# Patient Record
Sex: MALE | Race: WHITE | NOT HISPANIC OR LATINO
[De-identification: names, ages, dates, MRNs, and addresses within clinical notes are randomized per-mention and may not be internally consistent; named-entity substitution may affect disease eponyms.]

---

## 2020-06-03 ENCOUNTER — ERX REFILL RESPONSE (OUTPATIENT)
Age: 45
End: 2020-06-03

## 2020-06-03 RX ORDER — SPIRONOLACTONE 25 MG/1
TAKE 1 TABLET BY MOUTH TWICE A DAY TABLET ORAL
Qty: 60 | Refills: 1

## 2020-06-03 RX ORDER — FUROSEMIDE 40 MG/1
TAKE 1 TABLET BY MOUTH EVERY DAY TABLET ORAL
Qty: 30 | Refills: 0

## 2020-07-02 ENCOUNTER — TELEPHONE ENCOUNTER (OUTPATIENT)
Dept: URBAN - METROPOLITAN AREA CLINIC 92 | Facility: CLINIC | Age: 45
End: 2020-07-02

## 2020-07-02 ENCOUNTER — OFFICE VISIT (OUTPATIENT)
Dept: URBAN - METROPOLITAN AREA MEDICAL CENTER 42 | Facility: MEDICAL CENTER | Age: 45
End: 2020-07-02
Payer: COMMERCIAL

## 2020-07-02 DIAGNOSIS — K74.69 CIRRHOSIS, CRYPTOGENIC: ICD-10-CM

## 2020-07-02 DIAGNOSIS — K31.89 ACQUIRED DEFORMITY OF PYLORUS: ICD-10-CM

## 2020-07-02 PROCEDURE — 43235 EGD DIAGNOSTIC BRUSH WASH: CPT | Performed by: INTERNAL MEDICINE

## 2020-07-02 RX ORDER — OXYCODONE HYDROCHLORIDE 5 MG/1
TABLET ORAL
Qty: 0 | Refills: 0 | Status: ACTIVE | COMMUNITY
Start: 1900-01-01 | End: 1900-01-01

## 2020-07-02 RX ORDER — FUROSEMIDE 40 MG/1
TAKE 1 TABLET BY MOUTH EVERY DAY TABLET ORAL
Qty: 30 | Refills: 0 | Status: ACTIVE | COMMUNITY

## 2020-07-02 RX ORDER — LACTULOSE 10 G/15ML
TAKE 30 MILLILITERS (20 GRAM) BY ORAL ROUTE 3 TIMES PER DAY FOR 30 DAYS SOLUTION ORAL
Qty: 2700 | Refills: 3 | Status: ACTIVE | COMMUNITY
Start: 2020-05-01 | End: 2020-08-29

## 2020-07-02 RX ORDER — RIFAXIMIN 550 MG/1
TAKE 1 TABLET (550 MG) BY ORAL ROUTE 2 TIMES PER DAY FOR 30 DAYS TABLET ORAL 2
Qty: 60 | Refills: 11 | Status: ACTIVE | COMMUNITY
Start: 2020-05-15 | End: 2021-05-10

## 2020-07-02 RX ORDER — SPIRONOLACTONE 25 MG/1
TAKE 1 TABLET BY MOUTH TWICE A DAY TABLET ORAL
Qty: 60 | Refills: 1 | Status: ACTIVE | COMMUNITY

## 2020-07-07 ENCOUNTER — TELEPHONE ENCOUNTER (OUTPATIENT)
Dept: URBAN - METROPOLITAN AREA CLINIC 70 | Facility: CLINIC | Age: 45
End: 2020-07-07

## 2020-07-07 RX ORDER — DICYCLOMINE HYDROCHLORIDE 10 MG/1
1 TABLET CAPSULE ORAL
Qty: 90 | Refills: 1 | OUTPATIENT

## 2020-07-07 RX ORDER — OXYCODONE HYDROCHLORIDE 5 MG/1
TABLET ORAL
Qty: 0 | Refills: 0 | Status: ACTIVE | COMMUNITY
Start: 1900-01-01 | End: 1900-01-01

## 2020-07-07 RX ORDER — FUROSEMIDE 40 MG/1
TAKE 1 TABLET BY MOUTH EVERY DAY TABLET ORAL
Qty: 30 | Refills: 0 | Status: ACTIVE | COMMUNITY

## 2020-07-07 RX ORDER — SPIRONOLACTONE 25 MG/1
TAKE 1 TABLET BY MOUTH TWICE A DAY TABLET ORAL
Qty: 60 | Refills: 1 | Status: ACTIVE | COMMUNITY

## 2020-07-07 RX ORDER — RIFAXIMIN 550 MG/1
TAKE 1 TABLET (550 MG) BY ORAL ROUTE 2 TIMES PER DAY FOR 30 DAYS TABLET ORAL 2
Qty: 60 | Refills: 11 | Status: ACTIVE | COMMUNITY
Start: 2020-05-15 | End: 2021-05-10

## 2020-07-07 RX ORDER — LACTULOSE 10 G/15ML
TAKE 30 MILLILITERS (20 GRAM) BY ORAL ROUTE 3 TIMES PER DAY FOR 30 DAYS SOLUTION ORAL
Qty: 2700 | Refills: 3 | Status: ACTIVE | COMMUNITY
Start: 2020-05-01 | End: 2020-08-29

## 2020-07-07 RX ORDER — PANTOPRAZOLE SODIUM 40 MG/1
1 TABLET TABLET, DELAYED RELEASE ORAL TWICE A DAY
Qty: 60 | Refills: 5 | OUTPATIENT
Start: 2020-07-07

## 2020-07-15 ENCOUNTER — OFFICE VISIT (OUTPATIENT)
Dept: URBAN - METROPOLITAN AREA CLINIC 70 | Facility: CLINIC | Age: 45
End: 2020-07-15
Payer: COMMERCIAL

## 2020-07-15 ENCOUNTER — WEB ENCOUNTER (OUTPATIENT)
Dept: URBAN - METROPOLITAN AREA CLINIC 70 | Facility: CLINIC | Age: 45
End: 2020-07-15

## 2020-07-15 ENCOUNTER — OFFICE VISIT (OUTPATIENT)
Dept: URBAN - METROPOLITAN AREA CLINIC 70 | Facility: CLINIC | Age: 45
End: 2020-07-15

## 2020-07-15 DIAGNOSIS — R10.84 GENERALIZED ABDOMINAL PAIN: ICD-10-CM

## 2020-07-15 DIAGNOSIS — K72.90 HEPATIC ENCEPHALOPATHY: ICD-10-CM

## 2020-07-15 DIAGNOSIS — R19.7 DIARRHEA, UNSPECIFIED TYPE: ICD-10-CM

## 2020-07-15 DIAGNOSIS — K21.9 GASTROESOPHAGEAL REFLUX DISEASE WITHOUT ESOPHAGITIS: ICD-10-CM

## 2020-07-15 DIAGNOSIS — K70.31 ALCOHOLIC CIRRHOSIS OF LIVER WITH ASCITES: ICD-10-CM

## 2020-07-15 PROCEDURE — G8420 CALC BMI NORM PARAMETERS: HCPCS | Performed by: INTERNAL MEDICINE

## 2020-07-15 PROCEDURE — G8427 DOCREV CUR MEDS BY ELIG CLIN: HCPCS | Performed by: INTERNAL MEDICINE

## 2020-07-15 PROCEDURE — 1036F TOBACCO NON-USER: CPT | Performed by: INTERNAL MEDICINE

## 2020-07-15 PROCEDURE — 99214 OFFICE O/P EST MOD 30 MIN: CPT | Performed by: INTERNAL MEDICINE

## 2020-07-15 RX ORDER — RIFAXIMIN 550 MG/1
TAKE 1 TABLET (550 MG) BY ORAL ROUTE 2 TIMES PER DAY FOR 30 DAYS TABLET ORAL 2
Qty: 60 | Refills: 11 | Status: DISCONTINUED | COMMUNITY
Start: 2020-05-15 | End: 2021-05-10

## 2020-07-15 RX ORDER — LACTULOSE 10 G/15ML
TAKE 30 MILLILITERS (20 GRAM) BY ORAL ROUTE 3 TIMES PER DAY FOR 30 DAYS SOLUTION ORAL
Qty: 2700 | Refills: 3 | Status: DISCONTINUED | COMMUNITY
Start: 2020-05-01 | End: 2020-08-29

## 2020-07-15 RX ORDER — DICYCLOMINE HYDROCHLORIDE 10 MG/1
1 TABLET CAPSULE ORAL
Qty: 90 | Refills: 1 | Status: DISCONTINUED | COMMUNITY

## 2020-07-15 RX ORDER — OXYCODONE HYDROCHLORIDE 5 MG/1
TABLET ORAL
Qty: 0 | Refills: 0 | Status: DISCONTINUED | COMMUNITY
Start: 1900-01-01

## 2020-07-15 RX ORDER — PANTOPRAZOLE SODIUM 40 MG/1
1 TABLET TABLET, DELAYED RELEASE ORAL TWICE A DAY
Qty: 60 | Refills: 5 | Status: ACTIVE | COMMUNITY
Start: 2020-07-07

## 2020-07-15 RX ORDER — RIFAXIMIN 550 MG/1
TAKE 1 TABLET (550 MG) BY ORAL ROUTE 2 TIMES PER DAY FOR 30 DAYS TABLET ORAL 2
Qty: 60 | Refills: 11 | Status: ACTIVE | COMMUNITY
Start: 2020-05-15 | End: 2021-05-10

## 2020-07-15 RX ORDER — DICYCLOMINE HYDROCHLORIDE 10 MG/1
1 TABLET CAPSULE ORAL
Qty: 90 | Refills: 1 | Status: ACTIVE | COMMUNITY

## 2020-07-15 RX ORDER — SPIRONOLACTONE 25 MG/1
TAKE 1 TABLET BY MOUTH TWICE A DAY TABLET ORAL
Qty: 60 | Refills: 1 | Status: ACTIVE | COMMUNITY

## 2020-07-15 RX ORDER — PANTOPRAZOLE SODIUM 40 MG/1
1 TABLET TABLET, DELAYED RELEASE ORAL TWICE A DAY
Qty: 60 | Refills: 5 | Status: DISCONTINUED | COMMUNITY
Start: 2020-07-07

## 2020-07-15 RX ORDER — LACTULOSE 10 G/15ML
TAKE 30 MILLILITERS (20 GRAM) BY ORAL ROUTE 3 TIMES PER DAY FOR 30 DAYS SOLUTION ORAL
Qty: 2700 | Refills: 3 | Status: ACTIVE | COMMUNITY
Start: 2020-05-01 | End: 2020-08-29

## 2020-07-15 RX ORDER — OXYCODONE HYDROCHLORIDE 5 MG/1
TABLET ORAL
Qty: 0 | Refills: 0 | Status: ACTIVE | COMMUNITY
Start: 1900-01-01 | End: 1900-01-01

## 2020-07-15 RX ORDER — SPIRONOLACTONE 25 MG/1
TAKE 1 TABLET BY MOUTH TWICE A DAY TABLET ORAL
Qty: 60 | Refills: 1 | Status: DISCONTINUED | COMMUNITY

## 2020-07-15 RX ORDER — FUROSEMIDE 40 MG/1
TAKE 1 TABLET BY MOUTH EVERY DAY TABLET ORAL
Qty: 30 | Refills: 0 | Status: ACTIVE | COMMUNITY

## 2020-07-15 RX ORDER — FUROSEMIDE 40 MG/1
TAKE 1 TABLET BY MOUTH EVERY DAY TABLET ORAL
Qty: 30 | Refills: 0 | Status: DISCONTINUED | COMMUNITY

## 2020-07-15 NOTE — HPI-OTHER HISTORIES
44 year old male presents for Telehealth followup of cirrhosis. His history dates back to February 2020 where he noted progressively worsening abdominal distention and bloating He reports a very long history of alcohol use since adolescence. Viral hepatitis panel was negative. He was seen in the ED 4/15/20 in the setting of abdominal distention and bloating. He subsequently underwent labwork which revealed AST 70 ALT 54 alk phos 253 t-bili 9.8 (from 17.5), AFP 4.2 US paracentesis yielded 3.3L, cytology -ve. CT A/P 4/15/20 revealed moderate ascites, cirrhotic liver, and two areas of hyperenhancement on R liver lobe, rule out neoplasia. On 4/28 F/U he reports he has remained abstienent from alcohol since March 2020. He reports increasing abdominal girth as well as forgetfulness and "foggy memory." Denies melena or hematochezia Is maintained on Lasix 40mg qDay and Aldactone 25mg bid, is was  fully compliant with 2g Na diet- does report some LE edema. Since LOV he obtained CT liver 3-phase which revealed 5.3cm R liver lobe ill defined area, additionally another ill-defined area was noted, hyperenhancing in the R lobe, possible regenerative nodule. Lab data revealed the following: AST 30 ALT 31  T-bili 3.8 (down from 17), Na 135, Cr 0.71, INR 1.09, AFP 4.2, MELD-Na=14. Viral hepatitis panel and extended liver-2 panel negative. Paracentesis yielded 5500cc fluid, cytology negative for malignant cells.  He reports he was unable to tolerate Aldactone or Lactulose (due to diarrhea), now on Lasix alone. He has been compliant with 2g low sodium diet and reports less edema. Underwent EGD 7/2/20 revealing normal esophagus with no varices, mild PHG, significant retained food in stomach, suboptimal retroflexed view due to food in stomach, normal duodenum. He reported significant abdominal cramping/pain and diarrhea as well. States symptoms are quite debilitating.

## 2020-07-15 NOTE — HPI-TODAY'S VISIT:
44 year old male presents today in office for F/U of cirrhosis secondary to prior alcohol use . His history dates back to February 2020 where he noted progressively worsening abdominal distention and bloating He reports a very long history of alcohol use since adolescence. Viral hepatitis panel was negative. He was seen in the ED 4/15/20 in the setting of abdominal distention and bloating. He subsequently underwent labwork which revealed AST 70 ALT 54 alk phos 253 t-bili 9.8 (from 17.5), AFP 4.2 US paracentesis yielded 3.3L, cytology -ve. CT A/P 4/15/20 revealed moderate ascites, cirrhotic liver, and two areas of hyperenhancement on R liver lobe, rule out neoplasia. On 4/28 F/U he reports he has remained abstienent from alcohol since March 2020. He reports increasing abdominal girth as well as forgetfulness and "foggy memory." Denies melena or hematochezia Is maintained on Lasix 40mg qDay and Aldactone 25mg bid, is was  fully compliant with 2g Na diet- does report some LE edema. Since LOV he obtained CT liver 3-phase which revealed 5.3cm R liver lobe ill defined area, additionally another ill-defined area was noted, hyperenhancing in the R lobe, possible regenerative nodule. Lab data revealed the following: AST 30 ALT 31  T-bili 3.8 (down from 17), Na 135, Cr 0.71, INR 1.09, AFP 4.2, MELD-Na=14. Viral hepatitis panel and extended liver-2 panel negative. Paracentesis yielded 5500cc fluid, cytology negative for malignant cells.  He reports he was unable to tolerate Aldactone or Lactulose (due to diarrhea), now on Lasix alone. He has been compliant with 2g low sodium diet and reports less edema. EGD 7/2/20- normal esophagus no EVs, mild PHG, moderate retained food (biopsies not taken due to this), impaired retroflexed views, normal duodenum. Reported abd pain, cramping and diarrhea at day of endoscopy appt as well as nausea. He states that this has all resolved. Reports feeling much better since the onset of this.

## 2020-07-21 ENCOUNTER — ERX REFILL RESPONSE (OUTPATIENT)
Dept: URBAN - METROPOLITAN AREA CLINIC 70 | Facility: CLINIC | Age: 45
End: 2020-07-21

## 2020-07-21 RX ORDER — PANTOPRAZOLE SODIUM 40 MG/1
TAKE 1 TABLET BY MOUTH TWICE A DAY TABLET, DELAYED RELEASE ORAL
Qty: 180 TABLET | Refills: 2 | OUTPATIENT

## 2020-07-21 RX ORDER — PANTOPRAZOLE SODIUM 40 MG/1
1 TABLET TABLET, DELAYED RELEASE ORAL TWICE A DAY
Qty: 60 | Refills: 5 | OUTPATIENT

## 2020-07-31 ENCOUNTER — ERX REFILL RESPONSE (OUTPATIENT)
Dept: URBAN - METROPOLITAN AREA CLINIC 70 | Facility: CLINIC | Age: 45
End: 2020-07-31

## 2020-07-31 RX ORDER — DICYCLOMINE HYDROCHLORIDE 10 MG/1
TAKE 1 CAPSULE BY MOUTH 3 TIMES A DAY AS NEEDED FOR CRAMPS CAPSULE ORAL
Qty: 270 CAPSULE | Refills: 1 | OUTPATIENT

## 2020-07-31 RX ORDER — DICYCLOMINE HYDROCHLORIDE 10 MG/1
1 TABLET CAPSULE ORAL
Qty: 90 | Refills: 1 | OUTPATIENT

## 2020-08-26 ENCOUNTER — OFFICE VISIT (OUTPATIENT)
Dept: URBAN - METROPOLITAN AREA CLINIC 70 | Facility: CLINIC | Age: 45
End: 2020-08-26
Payer: COMMERCIAL

## 2020-08-26 DIAGNOSIS — R10.84 GENERALIZED ABDOMINAL PAIN: ICD-10-CM

## 2020-08-26 DIAGNOSIS — K72.90 HEPATIC ENCEPHALOPATHY: ICD-10-CM

## 2020-08-26 DIAGNOSIS — R19.7 DIARRHEA, UNSPECIFIED TYPE: ICD-10-CM

## 2020-08-26 DIAGNOSIS — K70.31 ALCOHOLIC CIRRHOSIS OF LIVER WITH ASCITES: ICD-10-CM

## 2020-08-26 DIAGNOSIS — K21.9 GASTROESOPHAGEAL REFLUX DISEASE WITHOUT ESOPHAGITIS: ICD-10-CM

## 2020-08-26 PROCEDURE — 992 APS NON BILLABLE: Performed by: INTERNAL MEDICINE

## 2020-08-26 RX ORDER — PANTOPRAZOLE SODIUM 40 MG/1
TAKE 1 TABLET BY MOUTH TWICE A DAY TABLET, DELAYED RELEASE ORAL
Qty: 180 TABLET | Refills: 2 | Status: DISCONTINUED | COMMUNITY

## 2020-08-26 RX ORDER — DICYCLOMINE HYDROCHLORIDE 10 MG/1
TAKE 1 CAPSULE BY MOUTH 3 TIMES A DAY AS NEEDED FOR CRAMPS CAPSULE ORAL
Qty: 270 CAPSULE | Refills: 1 | Status: DISCONTINUED | COMMUNITY

## 2020-08-26 NOTE — HPI-TODAY'S VISIT:
44 year old male presents today in office for F/U of cirrhosis secondary to prior alcohol use . His history dates back to February 2020 where he noted progressively worsening abdominal distention and bloating He reports a very long history of alcohol use since adolescence. Viral hepatitis panel was negative. He was seen in the ED 4/15/20 in the setting of abdominal distention and bloating. He subsequently underwent labwork which revealed AST 70 ALT 54 alk phos 253 t-bili 9.8 (from 17.5), AFP 4.2 US paracentesis yielded 3.3L, cytology -ve. CT A/P 4/15/20 revealed moderate ascites, cirrhotic liver, and two areas of hyperenhancement on R liver lobe, rule out neoplasia. On 4/28 F/U he reports he has remained abstienent from alcohol since March 2020. He reports increasing abdominal girth as well as forgetfulness and "foggy memory." Denies melena or hematochezia Is maintained on Lasix 40mg qDay and Aldactone 25mg bid, is was  fully compliant with 2g Na diet- does report some LE edema. Since LOV he obtained CT liver 3-phase which revealed 5.3cm R liver lobe ill defined area, additionally another ill-defined area was noted, hyperenhancing in the R lobe, possible regenerative nodule. Lab data revealed the following: AST 30 ALT 31  T-bili 3.8 (down from 17), Na 135, Cr 0.71, INR 1.09, AFP 4.2, MELD-Na=14. Viral hepatitis panel and extended liver-2 panel negative. Paracentesis yielded 5500cc fluid, cytology negative for malignant cells.  He reports he was unable to tolerate Aldactone or Lactulose (due to diarrhea), now on Lasix alone. He has been compliant with 2g low sodium diet and reports less edema. EGD 7/2/20- normal esophagus no EVs, mild PHG, moderate retained food (biopsies not taken due to this), impaired retroflexed views, normal duodenum. Reported abd pain, cramping and diarrhea at day of endoscopy appt as well as nausea. He states that this has all significantly improved, reports some alternating diarrhea/constipation but he states it is significantly improved. Some early satiety, reports he is not taking Protonix as he can't afford it even with the [employer] coupon. Repeat Labs 7/15/20 revealed AST 19 ALT 29 tbili 1.2, alk phos 61, INR 1.08. No encephalopaty, no LE edema.  Reports feeling much better since the onset of this.

## 2020-10-28 ENCOUNTER — LAB OUTSIDE AN ENCOUNTER (OUTPATIENT)
Dept: URBAN - METROPOLITAN AREA CLINIC 70 | Facility: CLINIC | Age: 45
End: 2020-10-28

## 2020-10-28 ENCOUNTER — OFFICE VISIT (OUTPATIENT)
Dept: URBAN - METROPOLITAN AREA CLINIC 70 | Facility: CLINIC | Age: 45
End: 2020-10-28
Payer: COMMERCIAL

## 2020-10-28 DIAGNOSIS — R19.7 DIARRHEA, UNSPECIFIED TYPE: ICD-10-CM

## 2020-10-28 DIAGNOSIS — K56.609 SMALL BOWEL OBSTRUCTION: ICD-10-CM

## 2020-10-28 DIAGNOSIS — K72.90 HEPATIC ENCEPHALOPATHY: ICD-10-CM

## 2020-10-28 DIAGNOSIS — R10.84 GENERALIZED ABDOMINAL PAIN: ICD-10-CM

## 2020-10-28 DIAGNOSIS — K70.31 ALCOHOLIC CIRRHOSIS OF LIVER WITH ASCITES: ICD-10-CM

## 2020-10-28 DIAGNOSIS — K21.9 GASTROESOPHAGEAL REFLUX DISEASE WITHOUT ESOPHAGITIS: ICD-10-CM

## 2020-10-28 PROCEDURE — G8482 FLU IMMUNIZE ORDER/ADMIN: HCPCS | Performed by: INTERNAL MEDICINE

## 2020-10-28 PROCEDURE — 99213 OFFICE O/P EST LOW 20 MIN: CPT | Performed by: INTERNAL MEDICINE

## 2020-10-28 PROCEDURE — G8420 CALC BMI NORM PARAMETERS: HCPCS | Performed by: INTERNAL MEDICINE

## 2020-10-28 PROCEDURE — G9903 PT SCRN TBCO ID AS NON USER: HCPCS | Performed by: INTERNAL MEDICINE

## 2020-10-28 PROCEDURE — G8427 DOCREV CUR MEDS BY ELIG CLIN: HCPCS | Performed by: INTERNAL MEDICINE

## 2020-10-28 RX ORDER — LACTULOSE 10 G/15ML
15 ML SOLUTION ORAL THREE TIMES A DAY
Qty: 1350 ML | Refills: 6 | OUTPATIENT
Start: 2020-10-28 | End: 2021-05-26

## 2020-10-28 NOTE — HPI-TODAY'S VISIT:
44 year old male presents today in office for F/U of cirrhosis secondary to prior alcohol use . His history dates back to February 2020 where he noted progressively worsening abdominal distention and bloating He reports a very long history of alcohol use since adolescence. Viral hepatitis panel was negative. He was seen in the ED 4/15/20 in the setting of abdominal distention and bloating. He subsequently underwent labwork which revealed AST 70 ALT 54 alk phos 253 t-bili 9.8 (from 17.5), AFP 4.2 US paracentesis yielded 3.3L, cytology -ve. CT A/P 4/15/20 revealed moderate ascites, cirrhotic liver, and two areas of hyperenhancement on R liver lobe, rule out neoplasia. On 4/28 F/U he reports he has remained abstienent from alcohol since March 2020. He reports increasing abdominal girth as well as forgetfulness and "foggy memory." Denies melena or hematochezia Is maintained on Lasix 40mg qDay and Aldactone 25mg bid, is was  fully compliant with 2g Na diet- does report some LE edema. Since LOV he obtained CT liver 3-phase which revealed 5.3cm R liver lobe ill defined area, additionally another ill-defined area was noted, hyperenhancing in the R lobe, possible regenerative nodule. Lab data revealed the following: AST 30 ALT 31  T-bili 3.8 (down from 17), Na 135, Cr 0.71, INR 1.09, AFP 4.2, MELD-Na=14. Viral hepatitis panel and extended liver-2 panel negative. Paracentesis yielded 5500cc fluid, cytology negative for malignant cells.  He reports he was unable to tolerate Aldactone or Lactulose (due to diarrhea), now on Lasix alone. He has been compliant with 2g low sodium diet and reports less edema. EGD 7/2/20- normal esophagus no EVs, mild PHG, moderate retained food (biopsies not taken due to this), impaired retroflexed views, normal duodenum. Reported abd pain, cramping and diarrhea at day of endoscopy appt as well as nausea. He states that this has all significantly improved, reports some alternating diarrhea/constipation but he states it is significantly improved. Some early satiety, reports he is not taking Protonix as he can't afford it even with the [employer] coupon. Repeat Labs 7/15/20 revealed AST 19 ALT 29 tbili 1.2, alk phos 61, INR 1.08. No encephalopaty, no LE edema.  On today's OV was seen 9/28 in the ED due to abd pain, emesis x 1 episode CT A/P with contrast revealed cirrhotic liver without mass lesions, but possible evolving SBO. He left AMA Today on F/U he states he is taking no medications. He has not taken lactulose. He reports reversal in sleep/wake cycle, difficulty sleeping, anxiety and depression, as well as generalized weakness. Reports intermittent abdominal cramping.

## 2020-10-30 LAB
A/G RATIO: 2.2
ALBUMIN: 4.3
ALKALINE PHOSPHATASE: 57
ALT (SGPT): 21
AST (SGOT): 15
BASO (ABSOLUTE): 0.1
BASOS: 1
BILIRUBIN, TOTAL: 0.3
BUN/CREATININE RATIO: 19
BUN: 17
CALCIUM: 9.4
CARBON DIOXIDE, TOTAL: 26
CHLORIDE: 99
CREATININE: 0.88
EGFR IF AFRICN AM: 120
EGFR IF NONAFRICN AM: 104
EOS (ABSOLUTE): 0.3
EOS: 3
GLOBULIN, TOTAL: 2
GLUCOSE: 135
HEMATOCRIT: 42.1
HEMATOLOGY COMMENTS:: (no result)
HEMOGLOBIN: 13.9
IMMATURE CELLS: (no result)
IMMATURE GRANS (ABS): 0
IMMATURE GRANULOCYTES: 0
INR: 1
LYMPHS (ABSOLUTE): 3
LYMPHS: 36
MCH: 30.5
MCHC: 33
MCV: 92
MONOCYTES(ABSOLUTE): 0.5
MONOCYTES: 6
NEUTROPHILS (ABSOLUTE): 4.7
NEUTROPHILS: 54
NRBC: (no result)
PLATELETS: 213
POTASSIUM: 4.6
PROTEIN, TOTAL: 6.3
PROTHROMBIN TIME: 10.9
RBC: 4.56
RDW: 12.8
SODIUM: 138
TSH: 2.04
WBC: 8.5

## 2020-12-01 ENCOUNTER — OFFICE VISIT (OUTPATIENT)
Dept: URBAN - METROPOLITAN AREA CLINIC 70 | Facility: CLINIC | Age: 45
End: 2020-12-01
Payer: COMMERCIAL

## 2020-12-01 DIAGNOSIS — K21.9 GASTROESOPHAGEAL REFLUX DISEASE WITHOUT ESOPHAGITIS: ICD-10-CM

## 2020-12-01 DIAGNOSIS — K56.609 SMALL BOWEL OBSTRUCTION: ICD-10-CM

## 2020-12-01 DIAGNOSIS — K70.31 ALCOHOLIC CIRRHOSIS OF LIVER WITH ASCITES: ICD-10-CM

## 2020-12-01 DIAGNOSIS — R10.84 GENERALIZED ABDOMINAL PAIN: ICD-10-CM

## 2020-12-01 DIAGNOSIS — K72.90 HEPATIC ENCEPHALOPATHY: ICD-10-CM

## 2020-12-01 DIAGNOSIS — R19.7 DIARRHEA, UNSPECIFIED TYPE: ICD-10-CM

## 2020-12-01 PROCEDURE — G8420 CALC BMI NORM PARAMETERS: HCPCS | Performed by: INTERNAL MEDICINE

## 2020-12-01 PROCEDURE — 99213 OFFICE O/P EST LOW 20 MIN: CPT | Performed by: INTERNAL MEDICINE

## 2020-12-01 PROCEDURE — G9903 PT SCRN TBCO ID AS NON USER: HCPCS | Performed by: INTERNAL MEDICINE

## 2020-12-01 PROCEDURE — G8427 DOCREV CUR MEDS BY ELIG CLIN: HCPCS | Performed by: INTERNAL MEDICINE

## 2020-12-01 PROCEDURE — G8483 FLU IMM NO ADMIN DOC REA: HCPCS | Performed by: INTERNAL MEDICINE

## 2020-12-01 RX ORDER — DICYCLOMINE HYDROCHLORIDE 10 MG/1
1 TABLET CAPSULE ORAL THREE TIMES A DAY
Qty: 90 TABLET | Refills: 3 | OUTPATIENT
Start: 2020-12-01 | End: 2021-03-31

## 2020-12-01 RX ORDER — PANTOPRAZOLE SODIUM 40 MG/1
1 TABLET TABLET, DELAYED RELEASE ORAL ONCE A DAY
Qty: 30 TABLET | Refills: 6 | OUTPATIENT
Start: 2020-12-01

## 2020-12-01 RX ORDER — LACTULOSE 10 G/15ML
15 ML SOLUTION ORAL THREE TIMES A DAY
Qty: 1350 ML | Refills: 6 | Status: ACTIVE | COMMUNITY
Start: 2020-10-28 | End: 2021-05-26

## 2020-12-01 NOTE — HPI-TODAY'S VISIT:
44 year old male presents today in office for F/U of cirrhosis secondary to prior alcohol use . His history dates back to February 2020 where he noted progressively worsening abdominal distention and bloating He reports a very long history of alcohol use since adolescence. Viral hepatitis panel was negative. He was seen in the ED 4/15/20 in the setting of abdominal distention and bloating. He subsequently underwent labwork which revealed AST 70 ALT 54 alk phos 253 t-bili 9.8 (from 17.5), AFP 4.2 US paracentesis yielded 3.3L, cytology -ve. CT A/P 4/15/20 revealed moderate ascites, cirrhotic liver, and two areas of hyperenhancement on R liver lobe, rule out neoplasia. On 4/28 F/U he reports he has remained abstienent from alcohol since March 2020. He reports increasing abdominal girth as well as forgetfulness and "foggy memory." Denies melena or hematochezia Is maintained on Lasix 40mg qDay and Aldactone 25mg bid, is was  fully compliant with 2g Na diet- does report some LE edema. Since LOV he obtained CT liver 3-phase which revealed 5.3cm R liver lobe ill defined area, additionally another ill-defined area was noted, hyperenhancing in the R lobe, possible regenerative nodule. Lab data revealed the following: AST 30 ALT 31  T-bili 3.8 (down from 17), Na 135, Cr 0.71, INR 1.09, AFP 4.2, MELD-Na=14. Viral hepatitis panel and extended liver-2 panel negative. Paracentesis yielded 5500cc fluid, cytology negative for malignant cells.  He reports he was unable to tolerate Aldactone or Lactulose (due to diarrhea), now on Lasix alone. He has been compliant with 2g low sodium diet and reports less edema. EGD 7/2/20- normal esophagus no EVs, mild PHG, moderate retained food (biopsies not taken due to this), impaired retroflexed views, normal duodenum. Reported abd pain, cramping and diarrhea at day of endoscopy appt as well as nausea. He states that this has all significantly improved, reports some alternating diarrhea/constipation but he states it is significantly improved. Some early satiety, reports he is not taking Protonix as he can't afford it even with the [employer] coupon. Repeat Labs 7/15/20 revealed AST 19 ALT 29 tbili 1.2, alk phos 61, INR 1.08. No encephalopaty, no LE edema.  On today's OV was seen 9/28 in the ED due to abd pain, emesis x 1 episode CT A/P with contrast revealed cirrhotic liver without mass lesions, but possible evolving SBO. He left AMA Today on F/U he states he is taking no medications. He has not taken lactulose. He reports reversal in sleep/wake cycle, difficulty sleeping, anxiety and depression, as well as generalized weakness. Reports intermittent abdominal cramping and today on F/U 12/1/20 he reports return of dyspepsia, as well as 1 episode of emesis over thanksgiving, he reports intermittent diarrhea as well, with alternating normal stool, as well as abdominal cramping. As stated above, he reports he is not on any medication at present. Lab data was obtained 9/29/20 revealing normal Hb, normal WBC and normal LFTs: AST 15 ALT 21 alk phos 57 t bili 0.3. He has not yet established care with a PCP and has reported less optimal control of blood glucose, has reported intermittent dizziness as did report dietary indiscretion during thanksgiving holiday.

## 2021-01-13 ENCOUNTER — OFFICE VISIT (OUTPATIENT)
Dept: URBAN - METROPOLITAN AREA CLINIC 70 | Facility: CLINIC | Age: 46
End: 2021-01-13
Payer: COMMERCIAL

## 2021-01-13 VITALS
WEIGHT: 138.6 LBS | TEMPERATURE: 97.1 F | DIASTOLIC BLOOD PRESSURE: 81 MMHG | HEART RATE: 90 BPM | HEIGHT: 68 IN | BODY MASS INDEX: 21.01 KG/M2 | SYSTOLIC BLOOD PRESSURE: 152 MMHG

## 2021-01-13 DIAGNOSIS — R11.2 NON-INTRACTABLE VOMITING WITH NAUSEA, UNSPECIFIED VOMITING TYPE: ICD-10-CM

## 2021-01-13 DIAGNOSIS — R10.84 GENERALIZED ABDOMINAL PAIN: ICD-10-CM

## 2021-01-13 DIAGNOSIS — K70.30 ALCOHOLIC CIRRHOSIS OF LIVER WITHOUT ASCITES: ICD-10-CM

## 2021-01-13 PROCEDURE — 99213 OFFICE O/P EST LOW 20 MIN: CPT | Performed by: INTERNAL MEDICINE

## 2021-01-13 PROCEDURE — 1036F TOBACCO NON-USER: CPT | Performed by: INTERNAL MEDICINE

## 2021-01-13 PROCEDURE — 99215 OFFICE O/P EST HI 40 MIN: CPT | Performed by: INTERNAL MEDICINE

## 2021-01-13 PROCEDURE — G8482 FLU IMMUNIZE ORDER/ADMIN: HCPCS | Performed by: INTERNAL MEDICINE

## 2021-01-13 PROCEDURE — G9903 PT SCRN TBCO ID AS NON USER: HCPCS | Performed by: INTERNAL MEDICINE

## 2021-01-13 PROCEDURE — G8428 CUR MEDS NOT DOCUMENT: HCPCS | Performed by: INTERNAL MEDICINE

## 2021-01-13 PROCEDURE — G8420 CALC BMI NORM PARAMETERS: HCPCS | Performed by: INTERNAL MEDICINE

## 2021-01-13 RX ORDER — DICYCLOMINE HYDROCHLORIDE 10 MG/1
1 TABLET CAPSULE ORAL THREE TIMES A DAY
Qty: 90 TABLET | Refills: 3 | Status: ACTIVE | COMMUNITY
Start: 2020-12-01 | End: 2021-03-31

## 2021-01-13 RX ORDER — LACTULOSE 10 G/15ML
15 ML SOLUTION ORAL THREE TIMES A DAY
Qty: 1350 ML | Refills: 6 | Status: ACTIVE | COMMUNITY
Start: 2020-10-28 | End: 2021-05-26

## 2021-01-13 RX ORDER — PANTOPRAZOLE SODIUM 40 MG/1
1 TABLET TABLET, DELAYED RELEASE ORAL ONCE A DAY
Qty: 30 TABLET | Refills: 6 | Status: ACTIVE | COMMUNITY
Start: 2020-12-01

## 2021-01-13 NOTE — HPI-TODAY'S VISIT:
44 year old male presents today in office for F/U of cirrhosis secondary to prior alcohol use . His history dates back to February 2020 where he noted progressively worsening abdominal distention and bloating He reports a very long history of alcohol use since adolescence. Viral hepatitis panel was negative. He was seen in the ED 4/15/20 in the setting of abdominal distention and bloating. He subsequently underwent labwork which revealed AST 70 ALT 54 alk phos 253 t-bili 9.8 (from 17.5), AFP 4.2 US paracentesis yielded 3.3L, cytology -ve. CT A/P 4/15/20 revealed moderate ascites, cirrhotic liver, and two areas of hyperenhancement on R liver lobe, rule out neoplasia. On 4/28 F/U he reports he has remained abstienent from alcohol since March 2020. He reports increasing abdominal girth as well as forgetfulness and "foggy memory." Denies melena or hematochezia Is maintained on Lasix 40mg qDay and Aldactone 25mg bid, is was  fully compliant with 2g Na diet- does report some LE edema. Since LOV he obtained CT liver 3-phase which revealed 5.3cm R liver lobe ill defined area, additionally another ill-defined area was noted, hyperenhancing in the R lobe, possible regenerative nodule. Lab data revealed the following: AST 30 ALT 31  T-bili 3.8 (down from 17), Na 135, Cr 0.71, INR 1.09, AFP 4.2, MELD-Na=14. Viral hepatitis panel and extended liver-2 panel negative. Paracentesis yielded 5500cc fluid, cytology negative for malignant cells.  He reports he was unable to tolerate Aldactone or Lactulose (due to diarrhea), now on Lasix alone. He has been compliant with 2g low sodium diet and reports less edema. EGD 7/2/20- normal esophagus no EVs, mild PHG, moderate retained food (biopsies not taken due to this), impaired retroflexed views, normal duodenum. Reported abd pain, cramping and diarrhea at day of endoscopy appt as well as nausea. He states that this has all significantly improved, reports some alternating diarrhea/constipation but he states it is significantly improved. Some early satiety, reports he is not taking Protonix as he can't afford it even with the [employer] coupon. Repeat Labs 7/15/20 revealed AST 19 ALT 29 tbili 1.2, alk phos 61, INR 1.08. No encephalopaty, no LE edema.  On today's OV was seen 9/28 in the ED due to abd pain, emesis x 1 episode CT A/P with contrast revealed cirrhotic liver without mass lesions, but possible evolving SBO. He left AMA Today on F/U he states he is taking no medications. He has not taken lactulose. He reports reversal in sleep/wake cycle, difficulty sleeping, anxiety and depression, as well as generalized weakness. Reports intermittent abdominal cramping and today on F/U 12/1/20 he reports return of dyspepsia, as well as 1 episode of emesis over thanksgiving, he reports intermittent diarrhea as well, with alternating normal stool, as well as abdominal cramping. As stated above, he reports he is not on any medication at present. Lab data was obtained 9/29/20 revealing normal Hb, normal WBC and normal LFTs: AST 15 ALT 21 alk phos 57 t bili 0.3. He has not yet established care with a PCP and has reported less optimal control of blood glucose, has reported intermittent dizziness as did report dietary indiscretion during thanksgiving holiday. Today on F/U 1/13/21 he reports severe abdominal pain, nausea, and 1 episode of emesis. He states he has been off his insulin for 2 weeks and has not yet obtained care with a PCP. He reports blurry vision and dizziness.

## 2021-02-04 ENCOUNTER — OFFICE VISIT (OUTPATIENT)
Dept: URBAN - METROPOLITAN AREA CLINIC 70 | Facility: CLINIC | Age: 46
End: 2021-02-04
Payer: COMMERCIAL

## 2021-02-04 DIAGNOSIS — K56.609 SMALL BOWEL OBSTRUCTION: ICD-10-CM

## 2021-02-04 DIAGNOSIS — K70.31 ALCOHOLIC CIRRHOSIS OF LIVER WITH ASCITES: ICD-10-CM

## 2021-02-04 DIAGNOSIS — K72.90 HEPATIC ENCEPHALOPATHY: ICD-10-CM

## 2021-02-04 DIAGNOSIS — K21.9 GASTROESOPHAGEAL REFLUX DISEASE WITHOUT ESOPHAGITIS: ICD-10-CM

## 2021-02-04 DIAGNOSIS — R19.7 DIARRHEA, UNSPECIFIED TYPE: ICD-10-CM

## 2021-02-04 DIAGNOSIS — R10.84 GENERALIZED ABDOMINAL PAIN: ICD-10-CM

## 2021-02-04 PROBLEM — 420054005: Status: ACTIVE | Noted: 2021-01-13

## 2021-02-04 PROCEDURE — G8427 DOCREV CUR MEDS BY ELIG CLIN: HCPCS | Performed by: INTERNAL MEDICINE

## 2021-02-04 PROCEDURE — 99213 OFFICE O/P EST LOW 20 MIN: CPT | Performed by: INTERNAL MEDICINE

## 2021-02-04 PROCEDURE — G8420 CALC BMI NORM PARAMETERS: HCPCS | Performed by: INTERNAL MEDICINE

## 2021-02-04 PROCEDURE — G9903 PT SCRN TBCO ID AS NON USER: HCPCS | Performed by: INTERNAL MEDICINE

## 2021-02-04 RX ORDER — PANTOPRAZOLE SODIUM 40 MG/1
1 TABLET TABLET, DELAYED RELEASE ORAL ONCE A DAY
Qty: 30 TABLET | Refills: 6 | Status: ACTIVE | COMMUNITY
Start: 2020-12-01

## 2021-02-04 RX ORDER — DICYCLOMINE HYDROCHLORIDE 10 MG/1
1 TABLET CAPSULE ORAL THREE TIMES A DAY
Qty: 90 TABLET | Refills: 3 | OUTPATIENT

## 2021-02-04 RX ORDER — LACTULOSE 10 G/15ML
15 ML SOLUTION ORAL THREE TIMES A DAY
Qty: 1350 ML | Refills: 6 | Status: ACTIVE | COMMUNITY
Start: 2020-10-28 | End: 2021-05-26

## 2021-02-04 RX ORDER — PANTOPRAZOLE SODIUM 40 MG/1
1 TABLET TABLET, DELAYED RELEASE ORAL ONCE A DAY
Qty: 30 TABLET | Refills: 6 | OUTPATIENT

## 2021-02-04 RX ORDER — DICYCLOMINE HYDROCHLORIDE 10 MG/1
1 TABLET CAPSULE ORAL THREE TIMES A DAY
Qty: 90 TABLET | Refills: 3 | Status: ACTIVE | COMMUNITY
Start: 2020-12-01 | End: 2021-03-31

## 2021-02-04 NOTE — HPI-TODAY'S VISIT:
44 year old male presents today in office for F/U of cirrhosis secondary to prior alcohol use . His history dates back to February 2020 where he noted progressively worsening abdominal distention and bloating He reports a very long history of alcohol use since adolescence. Viral hepatitis panel was negative. He was seen in the ED 4/15/20 in the setting of abdominal distention and bloating. He subsequently underwent labwork which revealed AST 70 ALT 54 alk phos 253 t-bili 9.8 (from 17.5), AFP 4.2 US paracentesis yielded 3.3L, cytology -ve. CT A/P 4/15/20 revealed moderate ascites, cirrhotic liver, and two areas of hyperenhancement on R liver lobe, rule out neoplasia. On 4/28 F/U he reports he has remained abstienent from alcohol since March 2020. He reports increasing abdominal girth as well as forgetfulness and "foggy memory." Denies melena or hematochezia Is maintained on Lasix 40mg qDay and Aldactone 25mg bid, is was  fully compliant with 2g Na diet- does report some LE edema. Since LOV he obtained CT liver 3-phase which revealed 5.3cm R liver lobe ill defined area, additionally another ill-defined area was noted, hyperenhancing in the R lobe, possible regenerative nodule. Lab data revealed the following: AST 30 ALT 31  T-bili 3.8 (down from 17), Na 135, Cr 0.71, INR 1.09, AFP 4.2, MELD-Na=14. Viral hepatitis panel and extended liver-2 panel negative. Paracentesis yielded 5500cc fluid, cytology negative for malignant cells.  He reports he was unable to tolerate Aldactone or Lactulose (due to diarrhea), now on Lasix alone. He has been compliant with 2g low sodium diet and reports less edema. EGD 7/2/20- normal esophagus no EVs, mild PHG, moderate retained food (biopsies not taken due to this), impaired retroflexed views, normal duodenum. Reported abd pain, cramping and diarrhea at day of endoscopy appt as well as nausea. He states that this has all significantly improved, reports some alternating diarrhea/constipation but he states it is significantly improved. Some early satiety, reports he is not taking Protonix as he can't afford it even with the [employer] coupon. Repeat Labs 7/15/20 revealed AST 19 ALT 29 tbili 1.2, alk phos 61, INR 1.08. No encephalopaty, no LE edema.  On today's OV was seen 9/28 in the ED due to abd pain, emesis x 1 episode CT A/P with contrast revealed cirrhotic liver without mass lesions, but possible evolving SBO. He left AMA Today on F/U he states he is taking no medications. He has not taken lactulose. He reports reversal in sleep/wake cycle, difficulty sleeping, anxiety and depression, as well as generalized weakness. Reports intermittent abdominal cramping and today on F/U 12/1/20 he reports return of dyspepsia, as well as 1 episode of emesis over thanksgiving, he reports intermittent diarrhea as well, with alternating normal stool, as well as abdominal cramping. As stated above, he reports he is not on any medication at present. Lab data was obtained 9/29/20 revealing normal Hb, normal WBC and normal LFTs: AST 15 ALT 21 alk phos 57 t bili 0.3. He has not yet established care with a PCP and has reported less optimal control of blood glucose, has reported intermittent dizziness as did report dietary indiscretion during thanksgiving holiday. On  F/U 1/13/21 he reports severe abdominal pain, nausea, and 1 episode of emesis. He states he has been off his insulin for 2 weeks and has not yet obtained care with a PCP. He reports blurry vision and dizziness. He was sent to the ED- lab data did not reveal DKA, and CT A/P was negaitve for obstruction or acute process. Today on F/U he reports improvement in abdominal cramping, irregular bowel movements and dyspepsia. He reports an association with anxiety. Compliance with medications has been a challenge as he had stopped everything previously. He reports he is doing better overall.

## 2021-05-05 ENCOUNTER — OFFICE VISIT (OUTPATIENT)
Dept: URBAN - METROPOLITAN AREA CLINIC 70 | Facility: CLINIC | Age: 46
End: 2021-05-05
Payer: COMMERCIAL

## 2021-05-05 ENCOUNTER — LAB OUTSIDE AN ENCOUNTER (OUTPATIENT)
Dept: URBAN - METROPOLITAN AREA CLINIC 70 | Facility: CLINIC | Age: 46
End: 2021-05-05

## 2021-05-05 VITALS
DIASTOLIC BLOOD PRESSURE: 82 MMHG | TEMPERATURE: 97.5 F | WEIGHT: 138.2 LBS | SYSTOLIC BLOOD PRESSURE: 120 MMHG | BODY MASS INDEX: 20.95 KG/M2 | HEIGHT: 68 IN | HEART RATE: 91 BPM

## 2021-05-05 DIAGNOSIS — K72.90 HEPATIC ENCEPHALOPATHY: ICD-10-CM

## 2021-05-05 DIAGNOSIS — R10.84 GENERALIZED ABDOMINAL PAIN: ICD-10-CM

## 2021-05-05 DIAGNOSIS — K21.9 GASTROESOPHAGEAL REFLUX DISEASE WITHOUT ESOPHAGITIS: ICD-10-CM

## 2021-05-05 DIAGNOSIS — K56.609 SMALL BOWEL OBSTRUCTION: ICD-10-CM

## 2021-05-05 DIAGNOSIS — R19.7 DIARRHEA, UNSPECIFIED TYPE: ICD-10-CM

## 2021-05-05 DIAGNOSIS — K70.31 ALCOHOLIC CIRRHOSIS OF LIVER WITH ASCITES: ICD-10-CM

## 2021-05-05 PROCEDURE — 99213 OFFICE O/P EST LOW 20 MIN: CPT | Performed by: INTERNAL MEDICINE

## 2021-05-05 RX ORDER — PANTOPRAZOLE SODIUM 40 MG/1
1 TABLET TABLET, DELAYED RELEASE ORAL ONCE A DAY
Qty: 30 TABLET | Refills: 6 | OUTPATIENT

## 2021-05-05 RX ORDER — LACTULOSE 10 G/15ML
15 ML SOLUTION ORAL THREE TIMES A DAY
Qty: 1350 ML | Refills: 6 | Status: DISCONTINUED | COMMUNITY
Start: 2020-10-28 | End: 2021-05-26

## 2021-05-05 RX ORDER — DICYCLOMINE HYDROCHLORIDE 10 MG/1
1 TABLET CAPSULE ORAL THREE TIMES A DAY
Qty: 90 TABLET | Refills: 3 | Status: DISCONTINUED | COMMUNITY

## 2021-05-05 RX ORDER — PANTOPRAZOLE SODIUM 40 MG/1
1 TABLET TABLET, DELAYED RELEASE ORAL ONCE A DAY
Qty: 30 TABLET | Refills: 6 | Status: DISCONTINUED | COMMUNITY

## 2021-05-05 NOTE — HPI-TODAY'S VISIT:
44 year old male presents today in office for F/U of cirrhosis secondary to prior alcohol use . His history dates back to February 2020 where he noted progressively worsening abdominal distention and bloating He reports a very long history of alcohol use since adolescence. Viral hepatitis panel was negative. He was seen in the ED 4/15/20 in the setting of abdominal distention and bloating. He subsequently underwent labwork which revealed AST 70 ALT 54 alk phos 253 t-bili 9.8 (from 17.5), AFP 4.2 US paracentesis yielded 3.3L, cytology -ve. CT A/P 4/15/20 revealed moderate ascites, cirrhotic liver, and two areas of hyperenhancement on R liver lobe, rule out neoplasia. On 4/28 F/U he reports he has remained abstienent from alcohol since March 2020. He reports increasing abdominal girth as well as forgetfulness and "foggy memory." Denies melena or hematochezia Is maintained on Lasix 40mg qDay and Aldactone 25mg bid, is was  fully compliant with 2g Na diet- does report some LE edema. Since LOV he obtained CT liver 3-phase which revealed 5.3cm R liver lobe ill defined area, additionally another ill-defined area was noted, hyperenhancing in the R lobe, possible regenerative nodule. Lab data revealed the following: AST 30 ALT 31  T-bili 3.8 (down from 17), Na 135, Cr 0.71, INR 1.09, AFP 4.2, MELD-Na=14. Viral hepatitis panel and extended liver-2 panel negative. Paracentesis yielded 5500cc fluid, cytology negative for malignant cells.  He reports he was unable to tolerate Aldactone or Lactulose (due to diarrhea), now on Lasix alone. He has been compliant with 2g low sodium diet and reports less edema. EGD 7/2/20- normal esophagus no EVs, mild PHG, moderate retained food (biopsies not taken due to this), impaired retroflexed views, normal duodenum. Reported abd pain, cramping and diarrhea at day of endoscopy appt as well as nausea. He states that this has all significantly improved, reports some alternating diarrhea/constipation but he states it is significantly improved. Some early satiety, reports he is not taking Protonix as he can't afford it even with the [employer] coupon. Repeat Labs 7/15/20 revealed AST 19 ALT 29 tbili 1.2, alk phos 61, INR 1.08. No encephalopaty, no LE edema.  On today's OV was seen 9/28 in the ED due to abd pain, emesis x 1 episode CT A/P with contrast revealed cirrhotic liver without mass lesions, but possible evolving SBO. He left AMA Today on F/U he states he is taking no medications. He has not taken lactulose. He reports reversal in sleep/wake cycle, difficulty sleeping, anxiety and depression, as well as generalized weakness. Reports intermittent abdominal cramping and today on F/U 12/1/20 he reports return of dyspepsia, as well as 1 episode of emesis over thanksgiving, he reports intermittent diarrhea as well, with alternating normal stool, as well as abdominal cramping. As stated above, he reports he is not on any medication at present. Lab data was obtained 9/29/20 revealing normal Hb, normal WBC and normal LFTs: AST 15 ALT 21 alk phos 57 t bili 0.3. He has not yet established care with a PCP and has reported less optimal control of blood glucose, has reported intermittent dizziness as did report dietary indiscretion during thanksgiving holiday. On  F/U 1/13/21 he reports severe abdominal pain, nausea, and 1 episode of emesis. He states he had been off his insulin for 2 weeks and has not yet obtained care with a PCP. He reported blurry vision and dizziness. He was sent to the ED- lab data did not reveal DKA, and CT A/P was negaitve for obstruction or acute process. Today on F/U he reports improvement in abdominal cramping, irregular bowel movements and dyspepsia. He reports an association with anxiety. Compliance with medications has been a challenge as he had stopped everything previously. He reports he is doing better overall. He reports he obtained an office visit with the [facility], however did not return due to a disagreement. Overall he reports feeling well.

## 2021-05-07 PROBLEM — 266435005 GASTROESOPHAGEAL REFLUX DISEASE WITHOUT ESOPHAGITIS: Status: ACTIVE | Noted: 2021-05-05

## 2021-07-27 ENCOUNTER — LAB OUTSIDE AN ENCOUNTER (OUTPATIENT)
Dept: URBAN - METROPOLITAN AREA CLINIC 70 | Facility: CLINIC | Age: 46
End: 2021-07-27

## 2021-07-28 LAB
A/G RATIO: 2
ALBUMIN: 4.1
ALKALINE PHOSPHATASE: 79
ALT (SGPT): 42
AST (SGOT): 28
BASO (ABSOLUTE): 0.1
BASOS: 1
BILIRUBIN, TOTAL: 0.3
BUN/CREATININE RATIO: 20
BUN: 18
CALCIUM: 9.3
CARBON DIOXIDE, TOTAL: 25
CHLORIDE: 101
CREATININE: 0.88
EGFR IF AFRICN AM: 120
EGFR IF NONAFRICN AM: 104
EOS (ABSOLUTE): 0.4
EOS: 4
GLOBULIN, TOTAL: 2.1
GLUCOSE: 184
HEMATOCRIT: 41.9
HEMATOLOGY COMMENTS:: (no result)
HEMOGLOBIN A1C: 6.3
HEMOGLOBIN: 14.1
IMMATURE CELLS: (no result)
IMMATURE GRANS (ABS): 0.1
IMMATURE GRANULOCYTES: 1
INR: 1
LYMPHS (ABSOLUTE): 2.8
LYMPHS: 30
MCH: 31.4
MCHC: 33.7
MCV: 93
MONOCYTES(ABSOLUTE): 0.5
MONOCYTES: 5
NEUTROPHILS (ABSOLUTE): 5.5
NEUTROPHILS: 59
NRBC: (no result)
PLATELETS: 213
POTASSIUM: 4.7
PROTEIN, TOTAL: 6.2
PROTHROMBIN TIME: 10.5
RBC: 4.49
RDW: 12.9
SODIUM: 139
WBC: 9.3

## 2021-08-05 ENCOUNTER — OFFICE VISIT (OUTPATIENT)
Dept: URBAN - METROPOLITAN AREA CLINIC 70 | Facility: CLINIC | Age: 46
End: 2021-08-05

## 2021-08-31 ENCOUNTER — OFFICE VISIT (OUTPATIENT)
Dept: URBAN - METROPOLITAN AREA TELEHEALTH 2 | Facility: TELEHEALTH | Age: 46
End: 2021-08-31
Payer: COMMERCIAL

## 2021-08-31 DIAGNOSIS — R11.14 BILIOUS VOMITING WITH NAUSEA: ICD-10-CM

## 2021-08-31 DIAGNOSIS — K70.31 ALCOHOLIC CIRRHOSIS OF LIVER WITH ASCITES: ICD-10-CM

## 2021-08-31 DIAGNOSIS — Z86.39 HISTORY OF DIABETES MELLITUS: ICD-10-CM

## 2021-08-31 PROCEDURE — 99442 PHONE E/M BY PHYS 11-20 MIN: CPT | Performed by: INTERNAL MEDICINE

## 2021-08-31 NOTE — HPI-OTHER HISTORIES
Last office note by Dr. Dustin Parks from LOV 5/5/21: 44 year old male presents today in office for F/U of cirrhosis secondary to prior alcohol use . His history dates back to February 2020 where he noted progressively worsening abdominal distention and bloating He reports a very long history of alcohol use since adolescence. Viral hepatitis panel was negative. He was seen in the ED 4/15/20 in the setting of abdominal distention and bloating. He subsequently underwent labwork which revealed AST 70 ALT 54 alk phos 253 t-bili 9.8 (from 17.5), AFP 4.2 US paracentesis yielded 3.3L, cytology -ve. CT A/P 4/15/20 revealed moderate ascites, cirrhotic liver, and two areas of hyperenhancement on R liver lobe, rule out neoplasia. On 4/28 F/U he reports he has remained abstienent from alcohol since March 2020. He reports increasing abdominal girth as well as forgetfulness and "foggy memory." Denies melena or hematochezia Is maintained on Lasix 40mg qDay and Aldactone 25mg bid, is was  fully compliant with 2g Na diet- does report some LE edema. Since LOV he obtained CT liver 3-phase which revealed 5.3cm R liver lobe ill defined area, additionally another ill-defined area was noted, hyperenhancing in the R lobe, possible regenerative nodule. Lab data revealed the following: AST 30 ALT 31  T-bili 3.8 (down from 17), Na 135, Cr 0.71, INR 1.09, AFP 4.2, MELD-Na=14. Viral hepatitis panel and extended liver-2 panel negative. Paracentesis yielded 5500cc fluid, cytology negative for malignant cells.  He reports he was unable to tolerate Aldactone or Lactulose (due to diarrhea), now on Lasix alone. He has been compliant with 2g low sodium diet and reports less edema. EGD 7/2/20- normal esophagus no EVs, mild PHG, moderate retained food (biopsies not taken due to this), impaired retroflexed views, normal duodenum. Reported abd pain, cramping and diarrhea at day of endoscopy appt as well as nausea. He states that this has all significantly improved, reports some alternating diarrhea/constipation but he states it is significantly improved. Some early satiety, reports he is not taking Protonix as he can't afford it even with the GoodRx coupon. Repeat Labs 7/15/20 revealed AST 19 ALT 29 tbili 1.2, alk phos 61, INR 1.08. No encephalopaty, no LE edema.  On today's OV was seen 9/28 in the ED due to abd pain, emesis x 1 episode CT A/P with contrast revealed cirrhotic liver without mass lesions, but possible evolving SBO. He left AMA

## 2021-08-31 NOTE — HPI-TODAY'S VISIT:
Patient presenting for evaluation of alcoholic cirrhosis.  Currently voices occasional nausea that occurs at random.  Vomiting occurs about 1-2 times a week and usually contains food previously eaten.  Also voiecs early satiety and upper abdominal pain.  Symptoms do improve after vomiting occurs.  Has a history of diabetes but due to lack of insurance, has been off medications for the last 2-3 months.  States checks levels at home with blood glucose levels averaging around 120s.  He denies signs of hematemesis or melena.   Last EGD was in July 2020 with no signs of esophageal varices, variable Z-line, moderate portal gastropathy, moderate amount of retained food in gastric body.  Has not had to have paracentesis since April 2020.  As a result, has been off diuretic therapy since LOV.    States he has lost his job and is currently without insurance.   Has been sober from alcohol since March 13, 2020.

## 2021-11-30 ENCOUNTER — LAB OUTSIDE AN ENCOUNTER (OUTPATIENT)
Dept: URBAN - METROPOLITAN AREA CLINIC 70 | Facility: CLINIC | Age: 46
End: 2021-11-30

## 2021-11-30 ENCOUNTER — OFFICE VISIT (OUTPATIENT)
Dept: URBAN - METROPOLITAN AREA CLINIC 70 | Facility: CLINIC | Age: 46
End: 2021-11-30
Payer: COMMERCIAL

## 2021-11-30 DIAGNOSIS — R19.5 LOOSE STOOLS: ICD-10-CM

## 2021-11-30 DIAGNOSIS — K70.31 ALCOHOLIC CIRRHOSIS OF LIVER WITH ASCITES: ICD-10-CM

## 2021-11-30 DIAGNOSIS — Z86.39 HISTORY OF DIABETES MELLITUS: ICD-10-CM

## 2021-11-30 DIAGNOSIS — R10.30 LOWER ABDOMINAL PAIN: ICD-10-CM

## 2021-11-30 PROCEDURE — 99214 OFFICE O/P EST MOD 30 MIN: CPT | Performed by: NURSE PRACTITIONER

## 2021-11-30 NOTE — HPI-OTHER HISTORIES
---Last office note from 08/31/2021: Patient presenting for evaluation of alcoholic cirrhosis.  Currently voices occasional nausea that occurs at random.  Vomiting occurs about 1-2 times a week and usually contains food previously eaten.  Also voiecs early satiety and upper abdominal pain.  Symptoms do improve after vomiting occurs.  Has a history of diabetes but due to lack of insurance, has been off medications for the last 2-3 months.  States checks levels at home with blood glucose levels averaging around 120s.  He denies signs of hematemesis or melena.   Last EGD was in July 2020 with no signs of esophageal varices, variable Z-line, moderate portal gastropathy, moderate amount of retained food in gastric body.  Has not had to have paracentesis since April 2020.  As a result, has been off diuretic therapy since LOV.    States he has lost his job and is currently without insurance.   Has been sober from alcohol since March 13, 2020.

## 2021-11-30 NOTE — HPI-TODAY'S VISIT:
Patient presents today for follow up in regards to alcoholic cirrhosis.  He was diagnosed over 2 years ago and has remained sober since initial diagnosis.  States has been unable to afford imaging as ordered for HCC screening and EGD procedure for EV screening due to lack of insurance and limited income.  Reports for the last 2 years, noticed leakage of stool during the night.  Occasional lower abdominal pain occurs that is described as a cramping or tightness sensation.  Defecation occurs daily but may not experience a complete sensation of evacuation.  Leakage varies in frequency and currently describes it as occuring about 1-2 times a week.  Eating may lead to increase sweating.    Of note, patient has a hx of diabetes as well.  However, due to lack of insurance has been off medication.  He has began to notice vision changes and increase in neuropathy due to this.  Average BS readings at home have been 150.  Unsure as to when his last Hgb A1C level was evaluated.

## 2021-12-08 ENCOUNTER — TELEPHONE ENCOUNTER (OUTPATIENT)
Dept: URBAN - METROPOLITAN AREA CLINIC 70 | Facility: CLINIC | Age: 46
End: 2021-12-08

## 2022-01-31 ENCOUNTER — LAB OUTSIDE AN ENCOUNTER (OUTPATIENT)
Dept: URBAN - METROPOLITAN AREA CLINIC 70 | Facility: CLINIC | Age: 47
End: 2022-01-31

## 2022-02-08 LAB
A/G RATIO: 2.1
ADENOVIRUS F 40/41: NOT DETECTED
ALBUMIN: 4.1
ALKALINE PHOSPHATASE: 76
ALT (SGPT): 22
AST (SGOT): 22
ASTROVIRUS: NOT DETECTED
BASO (ABSOLUTE): 0.1
BASOS: 1
BILIRUBIN, TOTAL: 0.2
BUN/CREATININE RATIO: 17
BUN: 19
C DIFFICILE TOXIN A/B: NOT DETECTED
CALCIUM: 8.8
CAMPYLOBACTER: NOT DETECTED
CARBON DIOXIDE, TOTAL: 27
CHLORIDE: 97
CREATININE: 1.11
CRYPTOSPORIDIUM: NOT DETECTED
CYCLOSPORA CAYETANENSIS: NOT DETECTED
E COLI O157: (no result)
EGFR IF AFRICN AM: 92
EGFR IF NONAFRICN AM: 79
ENTAMOEBA HISTOLYTICA: NOT DETECTED
ENTEROAGGREGATIVE E COLI: NOT DETECTED
ENTEROPATHOGENIC E COLI: NOT DETECTED
ENTEROTOXIGENIC E COLI: NOT DETECTED
EOS (ABSOLUTE): 0.4
EOS: 5
GIARDIA LAMBLIA: NOT DETECTED
GLOBULIN, TOTAL: 2
GLUCOSE: 154
HEMATOCRIT: 36.7
HEMATOLOGY COMMENTS:: (no result)
HEMOGLOBIN A1C: 7
HEMOGLOBIN: 12.6
IMMATURE CELLS: (no result)
IMMATURE GRANS (ABS): 0.1
IMMATURE GRANULOCYTES: 1
INR: 0.9
LYMPHS (ABSOLUTE): 3.4
LYMPHS: 40
MCH: 30.6
MCHC: 34.3
MCV: 89
MONOCYTES(ABSOLUTE): 0.6
MONOCYTES: 8
NEUTROPHILS (ABSOLUTE): 3.9
NEUTROPHILS: 45
NOROVIRUS GI/GII: NOT DETECTED
NRBC: (no result)
PANCREATIC ELASTASE, FECAL: 408
PLATELETS: 235
PLESIOMONAS SHIGELLOIDES: NOT DETECTED
POTASSIUM: 4
PROTEIN, TOTAL: 6.1
PROTHROMBIN TIME: 10
RBC: 4.12
RDW: 12.4
ROTAVIRUS A: NOT DETECTED
SALMONELLA: NOT DETECTED
SAPOVIRUS: NOT DETECTED
SHIGA-TOXIN-PRODUCING E COLI: NOT DETECTED
SHIGELLA/ENTEROINVASIVE E COLI: NOT DETECTED
SODIUM: 136
VIBRIO CHOLERAE: NOT DETECTED
VIBRIO: NOT DETECTED
WBC: 8.5
YERSINIA ENTEROCOLITICA: NOT DETECTED

## 2022-02-28 ENCOUNTER — OFFICE VISIT (OUTPATIENT)
Dept: URBAN - METROPOLITAN AREA CLINIC 70 | Facility: CLINIC | Age: 47
End: 2022-02-28

## 2022-02-28 NOTE — HPI-OTHER HISTORIES
---Last office note 11/30/2021: Patient presents today for follow up in regards to alcoholic cirrhosis.  He was diagnosed over 2 years ago and has remained sober since initial diagnosis.  States has been unable to afford imaging as ordered for HCC screening and EGD procedure for EV screening due to lack of insurance and limited income.  Reports for the last 2 years, noticed leakage of stool during the night.  Occasional lower abdominal pain occurs that is described as a cramping or tightness sensation.  Defecation occurs daily but may not experience a complete sensation of evacuation.  Leakage varies in frequency and currently describes it as occuring about 1-2 times a week.  Eating may lead to increase sweating.    Of note, patient has a hx of diabetes as well.  However, due to lack of insurance has been off medication.  He has began to notice vision changes and increase in neuropathy due to this.  Average BS readings at home have been 150.  Unsure as to when his last Hgb A1C level was evaluated.

## 2022-04-25 PROBLEM — 161445009: Status: ACTIVE | Noted: 2021-11-30

## 2022-04-25 PROBLEM — 420054005: Status: ACTIVE | Noted: 2020-07-15

## 2022-04-26 ENCOUNTER — OFFICE VISIT (OUTPATIENT)
Dept: URBAN - METROPOLITAN AREA CLINIC 70 | Facility: CLINIC | Age: 47
End: 2022-04-26

## 2022-05-23 ENCOUNTER — OFFICE VISIT (OUTPATIENT)
Dept: URBAN - METROPOLITAN AREA CLINIC 70 | Facility: CLINIC | Age: 47
End: 2022-05-23

## 2022-06-22 ENCOUNTER — OFFICE VISIT (OUTPATIENT)
Dept: URBAN - METROPOLITAN AREA CLINIC 70 | Facility: CLINIC | Age: 47
End: 2022-06-22

## 2022-06-30 ENCOUNTER — DASHBOARD ENCOUNTERS (OUTPATIENT)
Age: 47
End: 2022-06-30

## 2022-07-05 ENCOUNTER — OFFICE VISIT (OUTPATIENT)
Dept: URBAN - METROPOLITAN AREA CLINIC 70 | Facility: CLINIC | Age: 47
End: 2022-07-05